# Patient Record
Sex: MALE | Race: WHITE
[De-identification: names, ages, dates, MRNs, and addresses within clinical notes are randomized per-mention and may not be internally consistent; named-entity substitution may affect disease eponyms.]

---

## 2019-07-08 ENCOUNTER — HOSPITAL ENCOUNTER (INPATIENT)
Dept: HOSPITAL 74 - FM/S | Age: 69
LOS: 2 days | Discharge: HOME HEALTH | DRG: 470 | End: 2019-07-10
Attending: ORTHOPAEDIC SURGERY | Admitting: ORTHOPAEDIC SURGERY
Payer: COMMERCIAL

## 2019-07-08 VITALS — BODY MASS INDEX: 18.7 KG/M2

## 2019-07-08 DIAGNOSIS — M16.11: Primary | ICD-10-CM

## 2019-07-08 PROCEDURE — 8E0W0CZ ROBOTIC ASSISTED PROCEDURE OF TRUNK REGION, OPEN APPROACH: ICD-10-PCS | Performed by: ORTHOPAEDIC SURGERY

## 2019-07-08 PROCEDURE — 0SR903A REPLACEMENT OF RIGHT HIP JOINT WITH CERAMIC SYNTHETIC SUBSTITUTE, UNCEMENTED, OPEN APPROACH: ICD-10-PCS | Performed by: ORTHOPAEDIC SURGERY

## 2019-07-08 RX ADMIN — OXYCODONE HYDROCHLORIDE AND ACETAMINOPHEN SCH MG: 500 TABLET ORAL at 22:10

## 2019-07-08 RX ADMIN — KETOROLAC TROMETHAMINE SCH MG: 30 INJECTION INTRAMUSCULAR; INTRAVENOUS at 11:40

## 2019-07-08 RX ADMIN — CELECOXIB SCH MG: 200 CAPSULE ORAL at 22:09

## 2019-07-08 RX ADMIN — CEFAZOLIN SODIUM SCH MLS/HR: 2 SOLUTION INTRAVENOUS at 18:13

## 2019-07-08 RX ADMIN — DOCUSATE SODIUM,SENNOSIDES SCH TABLET: 50; 8.6 TABLET, FILM COATED ORAL at 22:07

## 2019-07-08 RX ADMIN — OXYCODONE HYDROCHLORIDE SCH MG: 10 TABLET, FILM COATED, EXTENDED RELEASE ORAL at 22:08

## 2019-07-08 RX ADMIN — ACETAMINOPHEN SCH MG: 325 TABLET ORAL at 23:50

## 2019-07-08 RX ADMIN — GABAPENTIN SCH MG: 300 CAPSULE ORAL at 22:09

## 2019-07-08 RX ADMIN — KETOROLAC TROMETHAMINE SCH MG: 30 INJECTION INTRAMUSCULAR; INTRAVENOUS at 18:13

## 2019-07-08 RX ADMIN — KETOROLAC TROMETHAMINE SCH MG: 30 INJECTION INTRAMUSCULAR; INTRAVENOUS at 23:50

## 2019-07-08 RX ADMIN — ACETAMINOPHEN SCH MG: 325 TABLET ORAL at 18:12

## 2019-07-08 NOTE — OP
Operative Note





- Note:


Operative Date: 07/08/19


Pre-Operative Diagnosis: right hip OA


Operation: right REGINA AMBER


Post-Operative Diagnosis: Same as Pre-op


Surgeon: Justin Heck


Assistant: Katalina Sapp


Anesthesia: Spinal


Estimated Blood Loss (mls): 200

## 2019-07-08 NOTE — HP
Admitting History and Physical





- Admission


Chief Complaint: right hip osteoarthritis x years


History of Present Illness: 


69 year old male presents in regard to their right hip. Long-standing history 

of right hip osteoarthritis. Patient complains of pain, limited range of motion

, difficulty ambulating, and difficulty with activities of daily living. 

Patient has failed conservative treatment options including PO medications, 

activity modification, injections, and exercise programs. At this point, 

patient like to proceed with surgical intervention, right total hip 

arthroplasty MAKOplasty.


History Source: Patient





- Past Medical History


Musculoskeletal: Yes: Osteoarthritis





- Past Surgical History


Additional Past Surgical History: 


See written history & physical.








- Smoking History


Smoking history: Former smoker


Have you smoked in the past 12 months: No


If you are a former smoker, when did you quit?: at age 20





- Alcohol/Substance Use


Hx Alcohol Use: Yes (social)





Home Medications





- Allergies


Allergies/Adverse Reactions: 


 Allergies











Allergy/AdvReac Type Severity Reaction Status Date / Time


 


No Known Allergies Allergy   Verified 06/24/19 12:12














- Home Medications


Home Medications: 


Ambulatory Orders





Meloxicam 15 mg PO DAILY 06/24/19 











Review of Systems





- Review of Systems


Musculoskeletal: reports: Decreased ROM (right hip), Joint Pain (right hip)





Physical Examination


Vital Signs: 


 Vital Signs











Temperature  97.6 F   07/08/19 06:35


 


Pulse Rate  63   07/08/19 06:35


 


Respiratory Rate  15   07/08/19 06:35


 


Blood Pressure  142/93   07/08/19 06:35


 


O2 Sat by Pulse Oximetry (%)      











Constitutional: Yes: Well Nourished, No Distress


Eyes: Yes: Conjunctiva Clear


HENT: Yes: Atraumatic


Neck: Yes: Supple


Cardiovascular: Yes: Regular Rate and Rhythm


Respiratory: Yes: Regular


Gastrointestinal: Yes: Soft


...Rectal Exam: Yes: Deferred


Musculoskeletal: Yes: Joint Stiffness (right hip)





Assessment/Plan


69 year old male presents in regard to their right hip. Long-standing history 

of right hip osteoarthritis. Patient complains of pain, limited range of motion

, difficulty ambulating, and difficulty with activities of daily living - 

including putting on his socks ans shoes ans clopping his toenails 

independently. Patient has failed conservative treatment options including PO 

medications, activity modification, injections, and exercise programs. At this 

point, patient like to proceed with surgical intervention, right total hip 

arthroplasty MAKOplasty. Pros, cons, risks, benefits, and alternatives of a 

right total hip arthroplasty MAKOplasty were discussed with the patient at 

length. Patient confirms their understanding and consents to proceed with a 

right total hip arthroplasty MAKOplasty.

## 2019-07-08 NOTE — SPEC
DATE OF OPERATION:  07/08/2019

 

DATE OF SURGERY:  07/08/2019

 

PREOPERATIVE DIAGNOSIS:  Right hip osteoarthritis.

 

POSTOPERATIVE DIAGNOSIS:  Right hip osteoarthritis.

 

PROCEDURE:  Right total hip replacement with MAKOplasty robotic navigation.

 

ATTENDING:  Aliya Echevarria MD

 

ASSISTANT:  EDVIN Rose

 

ANESTHESIA:  Spinal plus sedation.

 

ESTIMATED BLOOD LOSS:  200 mL. 

 

COMPLICATIONS:  None.

 

DISPOSITION:  The patient was transferred to the PACU in stable condition.

 

IMPLANTS USED:  Eugenio Accolade II size 7 femoral component, Hepler Trident II

54-mm acetabular component with 30-mm acetabular screw, MDM bipolar head ball and

liner with inner ceramic +4 mm offset head ball.

 

INDICATIONS:  This is a 69-year-old male who presented to the office complaining of

severe right hip pain.  He was seen and examined by Dr. Echevarria and diagnosed with

right hip osteoarthritis.  The patient was initially treated nonoperatively with

conservative measures but continued to have severe pain and ambulatory dysfunction. 

He was therefore indicated for a right total hip replacement.  The risks, benefits

and alternatives to the procedure were explained to the patient in great detail and

he elected to proceed with the surgery. 

 

DESCRIPTION:  On the day of surgery, the patient was taken to the operating room and

placed on the OR table.  Spinal anesthesia was administered by the anesthesiologist. 

The patient was then positioned in the lateral decubitus position on the table and

all bony prominences were padded.  An axillary roll was placed.  The operative hip

was then prepped and draped in the usual sterile fashion and intravenous antibiotics

were given for infection prophylaxis.  A surgical time-out was then performed with

the team, and the patients identity, procedure, side, availability of implants, and

the administration of antibiotics were confirmed.  

 

An approximately 15-cm longitudinal incision was made through the skin centered on

the greater trochanter of the hip.  This dissection was carried down through the

subcutaneous tissues to the deep fascia.  This fascia was then incised and a Cobra

was placed around the inferior femoral neck.  Electrocautery was used to reflect the

anterior 40% of the gluteus medius and minimus starting at the musculotendinous

junction and leaving a cuff for closure.  This was reflected to reveal the capsule of

the hip joint.  An anterior capsulectomy was performed and the femoral head and neck

were visualized.  Grade 4 changes were noted diffusely throughout the joint.

 

At this point, three small stab incisions were made superior to the main incision

along the iliac crest.  Three self-drilling Steinmann pins were then placed and the

Face-Me pelvic array was attached.  Reference points on the limb were then entered into

the robotic device and the limb length deficiency, offset, and femoral neck resection

level were then calculated by the software.  The hip was then dislocated with

traction and external rotation.  An oscillating saw was used to make the femoral neck

cut at the level previously templated, and the femoral head was removed. 

 

Attention was then turned to the acetabulum.  Retractors were then placed around the

acetabulum and the labrum was removed.  An acetabular checkpoint pin and the Face-Me

software were used to register the contours of the acetabulum.  The acetabulum was

then reamed in a single stage to the preoperatively templated size using the Face-Me

robotic arm.  The appropriately sized cup was then impacted and had solid fixation as

well as the preset inclination and version of 40 and 20 degrees, respectively.  A

polyethylene liner was then placed in the cup.

 

Attention was then turned back to the femur, which was externally rotated for

improved visualization. A femoral neck elevator was used to present the femoral neck

cut, a box osteotome was used to enter the femoral canal, and a canal finder was used

to go down the femoral shaft.  The Juan broaches were used sequentially until the

optimal scratch fit was achieved.  This correlated with the preoperatively templated

size.  From here, several different offset head and neck configurations were tested

until excellent stability and length were obtained.  These measurements were

quantified using the Face-Me software.

 

All trial components were then removed, the femur was copiously irrigated, and the

final components were placed.  Leg length and stability were checked again and found

to be excellent.  Irrigation was performed again.  Wound closure was started by

repairing the abductor muscles with a no. 2 FiberWire stitch in a Krackow

configuration passed through bone tunnels in the greater trochanter and tied over a

bony bridge.  This repair was then reinforced with a 0 V-Loc 180 barbed suture. 

Next, no. 1 Polysorb and 0 V-Loc 180 were used to close the fascia.  The deep

subcutaneous tissue was closed with no. 1 Polysorb sutures, and 2-0 Polysorb was used

for the superficial subcutaneous tissue.  The skin was closed using both 3-0 V-Loc 90

suture in a running subcuticular fashion and SwiftSet skin adhesive.  The Juan array

and pins were removed from the iliac crest and the stab incision sites were irrigated

and closed with 4-0 Polysorb sutures and SwiftSet skin adhesive.  Once this was

completed, a sterile dressing was applied.  The patient was then awakened and taken

to the PACU in stable condition.

 

ADDENDUM:  After final implants were placed, a 3-minute dilute Betadine lavage was

performed.  Following this, the wound was thoroughly irrigated with normal saline via

pulsatile lavage and wound closure was begun.

 

 

 

ALIYA ECHEVARRIA M.D.

 

[id number]

DD: 07/08/2019 11:43

DT: 07/08/2019 13:48

Job #:  [id number]

## 2019-07-09 LAB
ANION GAP SERPL CALC-SCNC: 10 MMOL/L (ref 8–16)
BUN SERPL-MCNC: 18 MG/DL (ref 7–18)
CALCIUM SERPL-MCNC: 8.6 MG/DL (ref 8.5–10)
CHLORIDE SERPL-SCNC: 98 MMOL/L (ref 98–107)
CO2 SERPL-SCNC: 27 MMOL/L (ref 21–32)
CREAT SERPL-MCNC: 0.9 MG/DL (ref 0.55–1.3)
DEPRECATED RDW RBC AUTO: 12.8 % (ref 11.9–15.9)
GLUCOSE SERPL-MCNC: 163 MG/DL (ref 74–106)
HCT VFR BLD CALC: 33.8 % (ref 35.4–49)
HGB BLD-MCNC: 11.7 GM/DL (ref 11.7–16.9)
MCH RBC QN AUTO: 33.4 PG (ref 25.7–33.7)
MCHC RBC AUTO-ENTMCNC: 34.5 G/DL (ref 32–35.9)
MCV RBC: 96.7 FL (ref 80–96)
PLATELET # BLD AUTO: 219 K/MM3 (ref 134–434)
PMV BLD: 8.9 FL (ref 7.5–11.1)
POTASSIUM SERPLBLD-SCNC: 4.6 MMOL/L (ref 3.5–5.1)
RBC # BLD AUTO: 3.5 M/MM3 (ref 4–5.6)
SODIUM SERPL-SCNC: 135 MMOL/L (ref 136–145)
WBC # BLD AUTO: 10.8 K/MM3 (ref 4–10.8)

## 2019-07-09 RX ADMIN — DOCUSATE SODIUM,SENNOSIDES SCH TABLET: 50; 8.6 TABLET, FILM COATED ORAL at 21:35

## 2019-07-09 RX ADMIN — OXYCODONE HYDROCHLORIDE AND ACETAMINOPHEN SCH MG: 500 TABLET ORAL at 21:35

## 2019-07-09 RX ADMIN — DOCUSATE SODIUM,SENNOSIDES SCH TABLET: 50; 8.6 TABLET, FILM COATED ORAL at 09:15

## 2019-07-09 RX ADMIN — OXYCODONE HYDROCHLORIDE SCH MG: 10 TABLET, FILM COATED, EXTENDED RELEASE ORAL at 21:35

## 2019-07-09 RX ADMIN — PANTOPRAZOLE SODIUM SCH MG: 40 TABLET, DELAYED RELEASE ORAL at 09:15

## 2019-07-09 RX ADMIN — ACETAMINOPHEN SCH MG: 325 TABLET ORAL at 17:25

## 2019-07-09 RX ADMIN — GABAPENTIN SCH MG: 300 CAPSULE ORAL at 21:35

## 2019-07-09 RX ADMIN — CELECOXIB SCH MG: 200 CAPSULE ORAL at 21:35

## 2019-07-09 RX ADMIN — ACETAMINOPHEN SCH: 325 TABLET ORAL at 12:25

## 2019-07-09 RX ADMIN — MULTIVITAMIN TABLET SCH TAB: TABLET at 09:23

## 2019-07-09 RX ADMIN — ACETAMINOPHEN SCH MG: 325 TABLET ORAL at 06:08

## 2019-07-09 RX ADMIN — OXYCODONE HYDROCHLORIDE SCH MG: 10 TABLET, FILM COATED, EXTENDED RELEASE ORAL at 09:15

## 2019-07-09 RX ADMIN — GABAPENTIN SCH MG: 300 CAPSULE ORAL at 09:15

## 2019-07-09 RX ADMIN — CEFAZOLIN SODIUM SCH MLS/HR: 2 SOLUTION INTRAVENOUS at 02:03

## 2019-07-09 RX ADMIN — ASPIRIN 325 MG ORAL TABLET SCH MG: 325 PILL ORAL at 07:56

## 2019-07-09 RX ADMIN — KETOROLAC TROMETHAMINE SCH MG: 30 INJECTION INTRAMUSCULAR; INTRAVENOUS at 06:10

## 2019-07-09 RX ADMIN — CELECOXIB SCH MG: 200 CAPSULE ORAL at 09:15

## 2019-07-09 RX ADMIN — OXYCODONE HYDROCHLORIDE AND ACETAMINOPHEN SCH MG: 500 TABLET ORAL at 09:15

## 2019-07-09 NOTE — PN
Progress Note (short form)





- Note


Progress Note: 





ANESTHESIA POSTOP


68 YO MALE POD#1 S/P AMBER, SPINAL WITH PNB


Patient resting in bed. No complaints. Pain adequately controlled. Tolerating 

PO. 


VSS, Afebrile


Continue current care, encouraged IS and active participation in PT. No 

anesthetic complications

## 2019-07-09 NOTE — PN
Progress Note (short form)





- Note


Progress Note: 





Pt seen and examined.  Doing well.





AVSS





 Selected Entries











  07/09/19 07/09/19





  15:38 21:00


 


Temperature 98.0 F 


 


Pulse Rate 75 


 


Respiratory 18 





Rate  


 


Blood Pressure 112/71 


 


O2 Sat by Pulse  97





Oximetry (%)  


 


Oxygen Delivery  Room Air





Method  








 Laboratory Tests











  07/09/19 07/09/19





  07:00 07:00


 


WBC  10.8 


 


Hgb  11.7 


 


Hct  33.8 L 


 


Plt Count  219 


 


Sodium   135 L


 


Potassium   4.6


 


Chloride   98


 


Carbon Dioxide   27


 


Anion Gap   10


 


BUN   18.0


 


Creatinine   0.9


 


Est GFR (CKD-EPI)AfAm   102.07


 


Est GFR (CKD-EPI)NonAf   88.07


 


Random Glucose   163 H


 


Calcium   8.6








Gen: NAD


RLE: c/d/i, NVID





A/P POD#1 s/p R AMBER





PT/OOB





D/C home in AM

## 2019-07-10 VITALS — HEART RATE: 62 BPM | TEMPERATURE: 98.1 F

## 2019-07-10 VITALS — SYSTOLIC BLOOD PRESSURE: 110 MMHG | DIASTOLIC BLOOD PRESSURE: 70 MMHG

## 2019-07-10 LAB
DEPRECATED RDW RBC AUTO: 13.1 % (ref 11.9–15.9)
HCT VFR BLD CALC: 32.2 % (ref 35.4–49)
HGB BLD-MCNC: 10.9 GM/DL (ref 11.7–16.9)
MCH RBC QN AUTO: 32.4 PG (ref 25.7–33.7)
MCHC RBC AUTO-ENTMCNC: 33.7 G/DL (ref 32–35.9)
MCV RBC: 96.1 FL (ref 80–96)
PLATELET # BLD AUTO: 199 K/MM3 (ref 134–434)
PMV BLD: 8.4 FL (ref 7.5–11.1)
RBC # BLD AUTO: 3.35 M/MM3 (ref 4–5.6)
WBC # BLD AUTO: 10.3 K/MM3 (ref 4–10.8)

## 2019-07-10 RX ADMIN — ACETAMINOPHEN SCH MG: 325 TABLET ORAL at 00:20

## 2019-07-10 RX ADMIN — GABAPENTIN SCH MG: 300 CAPSULE ORAL at 09:32

## 2019-07-10 RX ADMIN — OXYCODONE HYDROCHLORIDE SCH: 10 TABLET, FILM COATED, EXTENDED RELEASE ORAL at 09:32

## 2019-07-10 RX ADMIN — ASPIRIN 325 MG ORAL TABLET SCH MG: 325 PILL ORAL at 09:32

## 2019-07-10 RX ADMIN — CELECOXIB SCH MG: 200 CAPSULE ORAL at 09:32

## 2019-07-10 RX ADMIN — OXYCODONE HYDROCHLORIDE AND ACETAMINOPHEN SCH MG: 500 TABLET ORAL at 09:32

## 2019-07-10 RX ADMIN — DOCUSATE SODIUM,SENNOSIDES SCH TABLET: 50; 8.6 TABLET, FILM COATED ORAL at 09:32

## 2019-07-10 RX ADMIN — PANTOPRAZOLE SODIUM SCH MG: 40 TABLET, DELAYED RELEASE ORAL at 09:32

## 2019-07-10 RX ADMIN — ACETAMINOPHEN SCH MG: 325 TABLET ORAL at 07:08

## 2019-07-10 RX ADMIN — MULTIVITAMIN TABLET SCH TAB: TABLET at 09:32

## 2019-07-10 NOTE — DS
Physical Examination


Vital Signs: 


 Vital Signs











Temperature  98.3 F   07/09/19 22:56


 


Pulse Rate  79   07/09/19 22:56


 


Respiratory Rate  18   07/09/19 22:56


 


Blood Pressure  103/63   07/09/19 22:56


 


O2 Sat by Pulse Oximetry (%)  96   07/09/19 22:56











Labs: 


 CBC, BMP





 07/09/19 07:00 





 07/09/19 07:00 











Discharge Summary


Reason For Visit: RIGHT HIP OSTEOARTHRITIS


Current Active Problems





Osteoarthritis of right hip (Acute)








Procedures: Principal: right REGINA AMBER


Hospital Course: 





Admitted for elective surgery. Procedure performed without complications. 


Pt received postoperative antibiotic prophylaxis and DVT ppx.


Ambulated with physical therapy. Stable for discharge home with outpatient 

followup.


Condition: Stable





- Instructions


Diet, Activity, Other Instructions: 


Dr Heck - Hip Replacement Instructions





Keep the Aquacel dressing on until removed by Dr. Heck in 10-14 days - 


it is antibacterial and waterproof and you can shower with it on.





Call the office for a follow-up appointment with Dr. Heck in 10-14 days.  [phone]





Take one Aspirin 325mg daily for 6 weeks to prevent blood clots in your legs.





Take one Pantoprazole 40mg daily for 6 weeks to protect against heartburn and 

ulcers.





Take Cephalexin (antibiotic) 3x/day for 10 days to help prevent skin infection.





Take Celebrex 200mg daily for 30 days to reduce swelling and inflammation.





Take a multivitamin, stool softener and extra Vitamin C supplement daily.





For pain:





*Mild pain (1-3/10):





Take 1 Tramadol tablet every 4 hours as needed.





**Moderate pain (4-6/10):





Take 1 Tramadol tablet and 1 Percocet tablet every 4 hours as needed.





*** Severe pain (7-10/10):





Take 1 Tramadol tablet and 2 Percocet tablets every 4 hours as needed.





Activity: 


You can put as much weight on the operative leg as you want.





For the first 6 weeks, all you need to do is walk around the house, 


go up/down stairs, and sit down/get up. 





After 6 weeks when everything is healed (and bone has grown into the implant) 


you will be sent for more intensive outpatient physical therapy.





Always use a walker or cane for balance and to prevent falls.





Expect to see swelling / bruising from the operative site all the way down


to your toes.





Wear the Compression stocking on the operative side during the day to minimize 

how much


swelling there is in your foot/ankle.


Don't wear the stocking at night. You don't have to wear the stocking on the 

other side.


Disposition: VNS/HOME HEALTH CARE





- Home Medications


Comprehensive Discharge Medication List: 


Ambulatory Orders





Ascorbic Acid [Vitamin C -] 500 mg PO BID  tablet 07/10/19 


Aspirin [ASA -] 325 mg PO DAILY@0800  tablet 07/10/19 


Celecoxib [CeleBREX -] 200 mg PO DAILY #30 capsule 07/10/19 


Cephalexin Monohydrate [Keflex -] 500 mg PO TID #30 capsule 07/10/19 


Multivitamins [Multivit ([facility] Formulary)] 1 tab PO DAILY  tab 07/10/19 


Oxycodone HCl/Acetaminophen [Percocet 5-325 mg Tablet] 1 - 2 tab PO Q4H PRN #60 

tablet MDD 10 07/10/19 


Pantoprazole Sodium [Protonix -] 40 mg PO DAILY #40 tablet.ec 07/10/19 


Sennosides/Docusate Sodium [Pericolace -] 2 tablet PO BID  tablet 07/10/19 


traMADol HCL [Ultram -] 50 mg PO Q4H PRN #42 tablet MDD 6 07/10/19

## 2019-07-12 ENCOUNTER — HOSPITAL ENCOUNTER (EMERGENCY)
Dept: HOSPITAL 74 - FER | Age: 69
Discharge: HOME | End: 2019-07-12
Payer: COMMERCIAL

## 2019-07-12 VITALS — TEMPERATURE: 98.2 F | DIASTOLIC BLOOD PRESSURE: 82 MMHG | HEART RATE: 66 BPM | SYSTOLIC BLOOD PRESSURE: 179 MMHG

## 2019-07-12 VITALS — BODY MASS INDEX: 19.2 KG/M2

## 2019-07-12 DIAGNOSIS — Z87.891: ICD-10-CM

## 2019-07-12 DIAGNOSIS — M54.2: Primary | ICD-10-CM

## 2019-07-12 LAB
ALBUMIN SERPL-MCNC: 3.1 G/DL (ref 3.4–5)
ALP SERPL-CCNC: 48 U/L (ref 45–117)
ALT SERPL-CCNC: 39 U/L (ref 13–61)
ANION GAP SERPL CALC-SCNC: 6 MMOL/L (ref 8–16)
AST SERPL-CCNC: 71 U/L (ref 15–37)
BASOPHILS # BLD: 0.5 % (ref 0–2)
BILIRUB SERPL-MCNC: 1.2 MG/DL (ref 0.2–1)
BUN SERPL-MCNC: 10 MG/DL (ref 7–18)
CALCIUM SERPL-MCNC: 8.6 MG/DL (ref 8.5–10)
CHLORIDE SERPL-SCNC: 99 MMOL/L (ref 98–107)
CO2 SERPL-SCNC: 32 MMOL/L (ref 21–32)
CREAT SERPL-MCNC: 0.8 MG/DL (ref 0.55–1.3)
DEPRECATED RDW RBC AUTO: 12.9 % (ref 11.9–15.9)
EOSINOPHIL # BLD: 0.3 % (ref 0–4.5)
GLUCOSE SERPL-MCNC: 128 MG/DL (ref 74–106)
HCT VFR BLD CALC: 32.3 % (ref 35.4–49)
HGB BLD-MCNC: 11.1 GM/DL (ref 11.7–16.9)
LYMPHOCYTES # BLD: 5.3 % (ref 8–40)
MCH RBC QN AUTO: 32.9 PG (ref 25.7–33.7)
MCHC RBC AUTO-ENTMCNC: 34.3 G/DL (ref 32–35.9)
MCV RBC: 95.8 FL (ref 80–96)
MONOCYTES # BLD AUTO: 5.4 % (ref 3.8–10.2)
NEUTROPHILS # BLD: 88.5 % (ref 42.8–82.8)
PLATELET # BLD AUTO: 265 K/MM3 (ref 134–434)
PMV BLD: 8.1 FL (ref 7.5–11.1)
POTASSIUM SERPLBLD-SCNC: 4 MMOL/L (ref 3.5–5.1)
PROT SERPL-MCNC: 6 G/DL (ref 6.4–8.2)
RBC # BLD AUTO: 3.38 M/MM3 (ref 4–5.6)
SODIUM SERPL-SCNC: 137 MMOL/L (ref 136–145)
WBC # BLD AUTO: 8.3 K/MM3 (ref 4–10.8)

## 2019-07-12 PROCEDURE — 3E0333Z INTRODUCTION OF ANTI-INFLAMMATORY INTO PERIPHERAL VEIN, PERCUTANEOUS APPROACH: ICD-10-PCS

## 2019-07-12 NOTE — PN
Progress Note (short form)





- Note


Progress Note: 





CC:Asked to evaluate Pt. for potential PDPH s/p spinal anesthetic for Right THR 

on 7/8/19


HPI: 69 year old male complaining of 9/10 neck pain satrting pod#2 in the am. 

Non radiating, no headache, no visual change, no neck stiffness, no UE 

weakness. Neck pain slightly decreased from standing to supine. Pain is 

pinpoint at around C4-5 and slightly tender to touch.


A/P: POD# 4 s/p right thr now complaining of about 2 days of non-radiaing neck 

pain.


1. Very unlikely a pdph. I explained to the patient and his wife the very low 

probability of his neck pain being related to the spinal anesthetic. They 

understood that if the intrensity or quality of the pain changed or he 

developed related symptoms that he should return to the ER.

## 2019-07-12 NOTE — PATH
Surgical Pathology Report



Patient Name:  GLEN DEWEY

Accession #:  [id number]

Med. Rec. #:  [id number]                                                        

   /Age/Gender:  1950 (Age: 69) / M

Account:  [id number]                                                          

             Location: [facility] MED-[facility]

Taken:  2019

Received:  2019

Reported:  2019

Physicians:  Justin Heck M.D.

  



Specimen(s) Received

 RIGHT FEMORAL HEAD 





Clinical History

Right hip osteoarthritis







Final Diagnosis

FEMORAL HEAD, RIGHT, TOTAL HIP REPLACEMENT:

DEGENERATIVE JOINT DISEASE.





***Electronically Signed***

Agustina Hankins M.D.





Gross Description

Received in formalin, labeled "right femoral head," is a 5.0 x 5.0 x 4.4 cm.

femoral head with a 1.9 cm in length portion of femoral neck attached. The

margin of resection is smooth. There is a 5.2 cm greatest dimension area of

eburnation present. The remaining articular surface is tan-yellow and diffusely

nodular and granular. The underlying trabecular bone is yellow and hard. A

representative section is submitted in one cassette, following decalcification. 



/7/10/2019



[facility]/7/10/2019

## 2019-07-12 NOTE — PDOC
History of Present Illness





- General


Chief Complaint: Pain


Stated Complaint: NECK PAIN


Time Seen by Provider: 07/12/19 11:07


History Source: Patient


Exam Limitations: No Limitations





- History of Present Illness


Initial Comments: 





07/12/19 11:22





Mr. Bush is a 68 yo M presenting to the ER due to neck pain


He is s/p right hip makoplasty on Monday (4 days ago).


Pt was discharged to home on Wednesday (2 days ago).


The evening of discharge, he noted mild neck pain


Yesterday, while watching television, he noted worsening neck pain  


Pt reports that his pain is located in the center of his neck


It is worse with sitting upright or standing


He took Tramadol today which little effect


Pt is 8/10 at its worse


No fevers


No trauma or heavy lifting


no arm weakness


No bowel or bladder incontinence








PMH: Osteoarthritis


PSH: Right hip Makoplasty


Meds: Tramadol, Percocet, Asa 325mg


ALL: NKDA


Social: Alcohol socially


FH: non contributory





ROS:


GENERAL/CONSTITUTIONAL: No: fever, chills, weakness, loss of appetite.


HEAD, EYES, EARS, NOSE AND THROAT: No: change in vision, ear pain, discharge, 

sore throat, throat swelling.


CARDIOVASCULAR: No: chest pain, lightheadedness, palpitations, syncope


RESPIRATORY: No: cough, shortness of breath, wheezing, hemoptysis, stridor.


GASTROINTESTINAL: YNo: nausea, vomiting, diarrhea, abdominal cramping, rectal 

bleeding, constipation. 


GENITOURINARY: No: dysuria, hematuria, frequency, urgency, flank pain.


MUSCULOSKELETAL: Yes: neck pain No: back pain 


SKIN: No: lesions, pallor, rash or easy bruising.


NEUROLOGIC: No: headache, vertigo, paresthesias, weakness 


 








PE:


GENERAL: The patient is in no acute distress.


HEAD: Normal  .


EYES: PERRLA, EOMI, sclera anicteric, conjunctiva clear.


ENT: Ears normal, nares patent, oropharynx clear without exudates.  Moist 

mucous membranes.


NECK: Normal range of motion, supple  


LUNGS: Breath sounds equal, clear to auscultation bilaterally.  No wheezes, and 

no crackles.


HEART:Regular rate and rhythm, normal S1 and S2 without murmur, rub or gallop.


ABDOMEN: Soft, nontender, normoactive bowel sounds.   


EXTREMITIES: Normal range of motion, no edema.  No clubbing or cyanosis. No 

erythema, or tenderness.


NEUROLOGICAL: Cranial nerves II through XII grossly intact.  Normal speech.  No 

focal neurological deficits. 


Neck supple, no limitations in range of motion, No nuchal rigidity


No limitations in left hip flexion or extension


Sensation in tact


Midline cervical spine point tender to palpation


MUSCULOSKELETAL:  Back non-tender to palpation 


SKIN: Warm, Dry, normal turgor, no rashes or lesions noted.


07/12/19 11:24





07/12/19 12:57





07/12/19 17:09





07/13/19 18:13








Past History





- Past Medical History


Allergies/Adverse Reactions: 


 Allergies











Allergy/AdvReac Type Severity Reaction Status Date / Time


 


No Known Allergies Allergy   Verified 07/12/19 10:54











Home Medications: 


Ambulatory Orders





Ascorbic Acid [Vitamin C -] 500 mg PO BID  tablet 07/10/19 


Aspirin [ASA -] 325 mg PO DAILY@0800  tablet 07/10/19 


Celecoxib [CeleBREX -] 200 mg PO DAILY #30 capsule 07/10/19 


Cephalexin Monohydrate [Keflex -] 500 mg PO TID #30 capsule 07/10/19 


Multivitamins [Multivit ([facility] Formulary)] 1 tab PO DAILY  tab 07/10/19 


Oxycodone HCl/Acetaminophen [Percocet 5-325 mg Tablet] 1 - 2 tab PO Q4H PRN #60 

tablet MDD 10 07/10/19 


Pantoprazole Sodium [Protonix -] 40 mg PO DAILY #40 tablet.ec 07/10/19 


Sennosides/Docusate Sodium [Pericolace -] 2 tablet PO BID  tablet 07/10/19 


traMADol HCL [Ultram -] 50 mg PO Q4H PRN #42 tablet MDD 6 07/10/19 


Lidocaine 5% Patch [Lidoderm Patch -] 1 patch TP DAILY PRN #30 patch 07/12/19 


Methocarbamol [Robaxin -] 500 mg PO TID PRN #30 tablet 07/12/19 








Anemia: No


Asthma: No


Cancer: No


Cardiac Disorders: No


CVA: No


COPD: No


CHF: No


Dementia: No


Diabetes: No


GI Disorders: No


 Disorders: No


HTN: No


Hypercholesterolemia: No


Liver Disease: No


Seizures: No


Thyroid Disease: No





- Immunization History


Immunization Up to Date: Yes





- Suicide/Smoking/Psychosocial Hx


Smoking History: Former smoker


Have you smoked in the past 12 months: No


If you are a former smoker, when did you quit?: at age 20


Hx Alcohol Use: Yes (social)


Drug/Substance Use Hx: No


Substance Use Type: Alcohol





ED Treatment Course





- LABORATORY


CBC & Chemistry Diagram: 


 07/12/19 12:09





 07/12/19 12:09





Medical Decision Making





- Medical Decision Making


Pt presenting with neck pain post op


DD:


Musculoskeletal pain, post dural puncture headache (given positional nature of 

neck pain??), meningeal irritation


Will do basic labs


Will give IVF


Will give pain meds


Will consult Anesthesia


Will re assess





07/12/19 12:17


Anesthesia called


They will see 


07/12/19 12:43


Pt seen by Anesthesia


Symptoms at this time do not seem like dural puncture headache





Pt given percocet, fluids, robaxin


He is up and walking to the bathroom


07/12/19 13:06





07/12/19 14:08


Pt has been walking to and from the bathroom, no problems





07/12/19 14:08


 Laboratory Tests











  07/12/19 07/12/19





  12:09 12:09


 


WBC  8.3 


 


Hgb  11.1 L 


 


Hct  32.3 L 


 


Plt Count  265  D 


 


BUN   10.0


 


Creatinine   0.8











07/12/19 14:13


Pt stood to get ready to go home and pain recurred


Lidoderm patch applied


Will contact dr. Heck





07/12/19 14:19





07/12/19 15:10


case reviewed with Dr Heck


Likely muscular pain due to positioning in the OR


He approves giving Toradol


Pt did not think the lidoderm patch helped


Will discharge to home


Return to the ER for any worsening, progression of symptoms


Follow up with Dr Heck as he recommends


 








*DC/Admit/Observation/Transfer


Diagnosis at time of Disposition: 


 Neck pain without injury








- Discharge Dispostion


Disposition: HOME


Condition at time of disposition: Stable


Decision to Admit order: No





- Prescriptions


Prescriptions: 


Lidocaine 5% Patch [Lidoderm Patch -] 1 patch TP DAILY PRN #30 patch


 PRN Reason: Pain


Methocarbamol [Robaxin -] 500 mg PO TID PRN #30 tablet


 PRN Reason: Lower Back Pain





- Referrals


Referrals: 


Justin Heck MD [Staff Physician] - 





- Patient Instructions


Printed Discharge Instructions:  DI for Neck Pain


Additional Instructions: 


Mr Bush





Thank you for coming in to the ER


Please be sure to take your prescribed pain medications for pain


Please RETURN TO THE ER for worsening symptoms, fevers, chills, weakness, 

inability to take pain medications, any thing else that concerns you


Please follow up with Dr Heck as he recommended





- Post Discharge Activity

## 2021-12-30 ENCOUNTER — HOSPITAL ENCOUNTER (EMERGENCY)
Dept: HOSPITAL 74 - FER | Age: 71
Discharge: HOME | End: 2021-12-30
Payer: COMMERCIAL

## 2021-12-30 VITALS — SYSTOLIC BLOOD PRESSURE: 147 MMHG | TEMPERATURE: 98.2 F | HEART RATE: 65 BPM | DIASTOLIC BLOOD PRESSURE: 95 MMHG

## 2021-12-30 VITALS — BODY MASS INDEX: 19.2 KG/M2

## 2021-12-30 DIAGNOSIS — R09.81: ICD-10-CM

## 2021-12-30 DIAGNOSIS — R07.0: ICD-10-CM

## 2021-12-30 DIAGNOSIS — R05.9: Primary | ICD-10-CM

## 2021-12-30 PROCEDURE — U0003 INFECTIOUS AGENT DETECTION BY NUCLEIC ACID (DNA OR RNA); SEVERE ACUTE RESPIRATORY SYNDROME CORONAVIRUS 2 (SARS-COV-2) (CORONAVIRUS DISEASE [COVID-19]), AMPLIFIED PROBE TECHNIQUE, MAKING USE OF HIGH THROUGHPUT TECHNOLOGIES AS DESCRIBED BY CMS-2020-01-R: HCPCS

## 2021-12-30 PROCEDURE — C9803 HOPD COVID-19 SPEC COLLECT: HCPCS

## 2021-12-30 PROCEDURE — U0005 INFEC AGEN DETEC AMPLI PROBE: HCPCS
